# Patient Record
Sex: FEMALE | Race: BLACK OR AFRICAN AMERICAN | URBAN - METROPOLITAN AREA
[De-identification: names, ages, dates, MRNs, and addresses within clinical notes are randomized per-mention and may not be internally consistent; named-entity substitution may affect disease eponyms.]

---

## 2017-03-01 ENCOUNTER — OFFICE VISIT (OUTPATIENT)
Dept: INTERNAL MEDICINE CLINIC | Age: 42
End: 2017-03-01

## 2017-03-01 VITALS
RESPIRATION RATE: 16 BRPM | WEIGHT: 273 LBS | BODY MASS INDEX: 46.14 KG/M2 | HEART RATE: 115 BPM | DIASTOLIC BLOOD PRESSURE: 88 MMHG | OXYGEN SATURATION: 99 % | TEMPERATURE: 98.2 F | SYSTOLIC BLOOD PRESSURE: 154 MMHG

## 2017-03-01 DIAGNOSIS — N93.8 DUB (DYSFUNCTIONAL UTERINE BLEEDING): ICD-10-CM

## 2017-03-01 DIAGNOSIS — I89.0 LYMPHEDEMA OF BOTH LOWER EXTREMITIES: ICD-10-CM

## 2017-03-01 DIAGNOSIS — Z23 NEED FOR PROPHYLACTIC VACCINATION AGAINST STREPTOCOCCUS PNEUMONIAE (PNEUMOCOCCUS): ICD-10-CM

## 2017-03-01 DIAGNOSIS — E11.69 TYPE 2 DIABETES MELLITUS WITH OTHER SPECIFIED COMPLICATION (HCC): ICD-10-CM

## 2017-03-01 DIAGNOSIS — E11.43 GASTROPARESIS DIABETICORUM (HCC): ICD-10-CM

## 2017-03-01 DIAGNOSIS — E11.69 TYPE 2 DIABETES MELLITUS WITH OTHER SPECIFIED COMPLICATION (HCC): Primary | ICD-10-CM

## 2017-03-01 DIAGNOSIS — K31.84 GASTROPARESIS DIABETICORUM (HCC): ICD-10-CM

## 2017-03-01 LAB
A/G RATIO: 1.2 (ref 1.1–2.2)
ALBUMIN SERPL-MCNC: 4 G/DL (ref 3.4–5)
ALP BLD-CCNC: 109 U/L (ref 40–129)
ALT SERPL-CCNC: 21 U/L (ref 10–40)
ANION GAP SERPL CALCULATED.3IONS-SCNC: 20 MMOL/L (ref 3–16)
AST SERPL-CCNC: 21 U/L (ref 15–37)
BILIRUB SERPL-MCNC: <0.2 MG/DL (ref 0–1)
BUN BLDV-MCNC: 19 MG/DL (ref 7–20)
CALCIUM SERPL-MCNC: 9.1 MG/DL (ref 8.3–10.6)
CHLORIDE BLD-SCNC: 93 MMOL/L (ref 99–110)
CO2: 21 MMOL/L (ref 21–32)
CREAT SERPL-MCNC: 0.9 MG/DL (ref 0.6–1.1)
GFR AFRICAN AMERICAN: >60
GFR NON-AFRICAN AMERICAN: >60
GLOBULIN: 3.4 G/DL
GLUCOSE BLD-MCNC: 364 MG/DL (ref 70–99)
HCT VFR BLD CALC: 37.1 % (ref 36–48)
HEMOGLOBIN: 12.5 G/DL (ref 12–16)
MCH RBC QN AUTO: 28 PG (ref 26–34)
MCHC RBC AUTO-ENTMCNC: 33.7 G/DL (ref 31–36)
MCV RBC AUTO: 83.1 FL (ref 80–100)
PDW BLD-RTO: 13.2 % (ref 12.4–15.4)
PLATELET # BLD: 131 K/UL (ref 135–450)
PMV BLD AUTO: 12.4 FL (ref 5–10.5)
POTASSIUM SERPL-SCNC: 4.5 MMOL/L (ref 3.5–5.1)
RBC # BLD: 4.47 M/UL (ref 4–5.2)
SODIUM BLD-SCNC: 134 MMOL/L (ref 136–145)
TOTAL PROTEIN: 7.4 G/DL (ref 6.4–8.2)
WBC # BLD: 9.2 K/UL (ref 4–11)

## 2017-03-01 PROCEDURE — 90732 PPSV23 VACC 2 YRS+ SUBQ/IM: CPT | Performed by: INTERNAL MEDICINE

## 2017-03-01 PROCEDURE — 90471 IMMUNIZATION ADMIN: CPT | Performed by: INTERNAL MEDICINE

## 2017-03-01 PROCEDURE — 99214 OFFICE O/P EST MOD 30 MIN: CPT | Performed by: INTERNAL MEDICINE

## 2017-03-01 RX ORDER — METOCLOPRAMIDE 10 MG/1
10 TABLET ORAL 4 TIMES DAILY
Qty: 20 TABLET | Refills: 0 | Status: SHIPPED | OUTPATIENT
Start: 2017-03-01

## 2017-03-01 RX ORDER — PANTOPRAZOLE SODIUM 40 MG/1
40 TABLET, DELAYED RELEASE ORAL DAILY
Qty: 90 TABLET | Refills: 1 | Status: SHIPPED | OUTPATIENT
Start: 2017-03-01 | End: 2017-04-30 | Stop reason: ALTCHOICE

## 2017-03-01 RX ORDER — PROMETHAZINE HYDROCHLORIDE 25 MG/1
TABLET ORAL
Qty: 90 TABLET | Refills: 0 | Status: SHIPPED | OUTPATIENT
Start: 2017-03-01

## 2017-03-01 RX ORDER — COMPRESSION  PANTYHOSE, SMALL
2 EACH MISCELLANEOUS DAILY
Qty: 3 EACH | Refills: 0 | Status: SHIPPED | OUTPATIENT
Start: 2017-03-01

## 2017-03-01 RX ORDER — PROMETHAZINE HYDROCHLORIDE 25 MG/1
TABLET ORAL
Qty: 90 TABLET | Refills: 0 | Status: SHIPPED | OUTPATIENT
Start: 2017-03-01 | End: 2017-03-01 | Stop reason: SDUPTHER

## 2017-03-01 RX ORDER — LOSARTAN POTASSIUM 100 MG/1
100 TABLET ORAL DAILY
Qty: 90 TABLET | Refills: 3 | Status: SHIPPED | OUTPATIENT
Start: 2017-03-01

## 2017-03-01 RX ORDER — METFORMIN HYDROCHLORIDE 750 MG/1
750 TABLET, EXTENDED RELEASE ORAL 2 TIMES DAILY
Qty: 180 TABLET | Refills: 1 | Status: SHIPPED | OUTPATIENT
Start: 2017-03-01 | End: 2026-09-26

## 2017-03-01 ASSESSMENT — PATIENT HEALTH QUESTIONNAIRE - PHQ9
1. LITTLE INTEREST OR PLEASURE IN DOING THINGS: 0
2. FEELING DOWN, DEPRESSED OR HOPELESS: 0
SUM OF ALL RESPONSES TO PHQ9 QUESTIONS 1 & 2: 0
SUM OF ALL RESPONSES TO PHQ9 QUESTIONS 1 & 2: 0
SUM OF ALL RESPONSES TO PHQ QUESTIONS 1-9: 0
SUM OF ALL RESPONSES TO PHQ QUESTIONS 1-9: 0
2. FEELING DOWN, DEPRESSED OR HOPELESS: 0
1. LITTLE INTEREST OR PLEASURE IN DOING THINGS: 0

## 2017-03-01 ASSESSMENT — ENCOUNTER SYMPTOMS
GASTROINTESTINAL NEGATIVE: 1
RESPIRATORY NEGATIVE: 1

## 2017-03-02 LAB
CHOLESTEROL, TOTAL: 375 MG/DL (ref 0–199)
ESTIMATED AVERAGE GLUCOSE: 289.1 MG/DL
HBA1C MFR BLD: 11.7 %
HDLC SERPL-MCNC: 60 MG/DL (ref 40–60)
LDL CHOLESTEROL CALCULATED: ABNORMAL MG/DL
LDL CHOLESTEROL DIRECT: 273 MG/DL
TRIGL SERPL-MCNC: 418 MG/DL (ref 0–150)
VLDLC SERPL CALC-MCNC: ABNORMAL MG/DL

## 2017-03-14 RX ORDER — ATORVASTATIN CALCIUM 40 MG/1
40 TABLET, FILM COATED ORAL DAILY
Qty: 90 TABLET | Refills: 1 | Status: SHIPPED | OUTPATIENT
Start: 2017-03-14

## 2017-04-26 DIAGNOSIS — E11.69 TYPE 2 DIABETES MELLITUS WITH OTHER SPECIFIED COMPLICATION (HCC): ICD-10-CM

## 2017-09-11 ENCOUNTER — OFFICE VISIT (OUTPATIENT)
Dept: INTERNAL MEDICINE CLINIC | Age: 42
End: 2017-09-11

## 2017-09-11 ENCOUNTER — TELEPHONE (OUTPATIENT)
Dept: INTERNAL MEDICINE CLINIC | Age: 42
End: 2017-09-11

## 2017-09-11 VITALS
BODY MASS INDEX: 46.17 KG/M2 | SYSTOLIC BLOOD PRESSURE: 162 MMHG | HEART RATE: 109 BPM | OXYGEN SATURATION: 98 % | WEIGHT: 269 LBS | DIASTOLIC BLOOD PRESSURE: 86 MMHG

## 2017-09-11 DIAGNOSIS — G43.009 MIGRAINE WITHOUT AURA AND WITHOUT STATUS MIGRAINOSUS, NOT INTRACTABLE: ICD-10-CM

## 2017-09-11 DIAGNOSIS — R53.1 ACUTE RIGHT-SIDED WEAKNESS: ICD-10-CM

## 2017-09-11 DIAGNOSIS — R47.01 APHASIA: Primary | ICD-10-CM

## 2017-09-11 DIAGNOSIS — I10 ESSENTIAL HYPERTENSION: ICD-10-CM

## 2017-09-11 PROCEDURE — 99214 OFFICE O/P EST MOD 30 MIN: CPT | Performed by: NURSE PRACTITIONER

## 2017-09-11 RX ORDER — AMITRIPTYLINE HYDROCHLORIDE 10 MG/1
10 TABLET, FILM COATED ORAL NIGHTLY PRN
Qty: 30 TABLET | Refills: 3 | Status: SHIPPED | OUTPATIENT
Start: 2017-09-11

## 2017-09-12 ENCOUNTER — TELEPHONE (OUTPATIENT)
Dept: PSYCHIATRY | Age: 42
End: 2017-09-12

## 2017-09-12 ENCOUNTER — HOSPITAL ENCOUNTER (OUTPATIENT)
Dept: MRI IMAGING | Age: 42
Discharge: HOME | End: 2017-09-12
Attending: NURSE PRACTITIONER | Admitting: NURSE PRACTITIONER

## 2017-09-12 DIAGNOSIS — R47.01 APHASIA: ICD-10-CM

## 2017-09-12 DIAGNOSIS — R29.90 STROKE-LIKE SYMPTOM: Primary | ICD-10-CM

## 2017-09-13 DIAGNOSIS — R47.01 APHASIA: Primary | ICD-10-CM

## 2017-09-15 ENCOUNTER — TELEPHONE (OUTPATIENT)
Dept: INTERNAL MEDICINE CLINIC | Age: 42
End: 2017-09-15

## 2017-09-26 ENCOUNTER — INITIAL CONSULT (OUTPATIENT)
Dept: NEUROLOGY | Age: 42
End: 2017-09-26

## 2017-09-26 VITALS
HEIGHT: 64 IN | OXYGEN SATURATION: 99 % | DIASTOLIC BLOOD PRESSURE: 98 MMHG | WEIGHT: 266 LBS | BODY MASS INDEX: 45.41 KG/M2 | HEART RATE: 120 BPM | SYSTOLIC BLOOD PRESSURE: 149 MMHG

## 2017-09-26 DIAGNOSIS — E78.5 DYSLIPIDEMIA: ICD-10-CM

## 2017-09-26 DIAGNOSIS — I10 HTN (HYPERTENSION), BENIGN: ICD-10-CM

## 2017-09-26 DIAGNOSIS — I63.9 CEREBROVASCULAR ACCIDENT (CVA), UNSPECIFIED MECHANISM (HCC): Primary | ICD-10-CM

## 2017-09-26 DIAGNOSIS — E11.69 TYPE 2 DIABETES MELLITUS WITH OTHER SPECIFIED COMPLICATION (HCC): ICD-10-CM

## 2017-09-26 PROCEDURE — 99204 OFFICE O/P NEW MOD 45 MIN: CPT | Performed by: PSYCHIATRY & NEUROLOGY

## 2017-09-26 PROCEDURE — 36415 COLL VENOUS BLD VENIPUNCTURE: CPT | Performed by: PSYCHIATRY & NEUROLOGY

## 2017-09-26 ASSESSMENT — ENCOUNTER SYMPTOMS
VOMITING: 0
HEMOPTYSIS: 0
PHOTOPHOBIA: 0
COUGH: 0
NAUSEA: 0
DOUBLE VISION: 0

## 2019-05-31 ENCOUNTER — TELEPHONE (OUTPATIENT)
Dept: INTERNAL MEDICINE CLINIC | Age: 44
End: 2019-05-31

## 2019-09-19 ENCOUNTER — TELEPHONE (OUTPATIENT)
Dept: INTERNAL MEDICINE CLINIC | Age: 44
End: 2019-09-19

## 2021-01-31 ENCOUNTER — HOSPITAL ENCOUNTER (EMERGENCY)
Age: 46
Discharge: HOME | End: 2021-01-31
Payer: COMMERCIAL

## 2021-01-31 ENCOUNTER — APPOINTMENT (OUTPATIENT)
Dept: GENERAL RADIOLOGY | Age: 46
End: 2021-01-31
Payer: COMMERCIAL

## 2021-01-31 VITALS
DIASTOLIC BLOOD PRESSURE: 81 MMHG | HEART RATE: 97 BPM | WEIGHT: 167 LBS | HEIGHT: 64 IN | RESPIRATION RATE: 16 BRPM | SYSTOLIC BLOOD PRESSURE: 124 MMHG | BODY MASS INDEX: 28.51 KG/M2 | TEMPERATURE: 98.2 F | OXYGEN SATURATION: 99 %
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
## 2021-01-31 DIAGNOSIS — M25.552 LEFT HIP PAIN: Primary | ICD-10-CM

## 2021-01-31 PROCEDURE — 99283 EMERGENCY DEPT VISIT LOW MDM: CPT

## 2021-01-31 PROCEDURE — 73502 X-RAY EXAM HIP UNI 2-3 VIEWS: CPT

## 2021-01-31 PROCEDURE — 6370000000 HC RX 637 (ALT 250 FOR IP): Performed by: PHYSICIAN ASSISTANT

## 2021-01-31 RX ORDER — AMLODIPINE BESYLATE 5 MG/1
5 TABLET ORAL DAILY
COMMUNITY

## 2021-01-31 RX ORDER — HYDROCODONE BITARTRATE AND ACETAMINOPHEN 5; 325 MG/1; MG/1
1 TABLET ORAL ONCE
Status: COMPLETED | OUTPATIENT
Start: 2021-01-31 | End: 2021-01-31

## 2021-01-31 RX ORDER — LIDOCAINE 50 MG/G
1 PATCH TOPICAL DAILY
Qty: 30 PATCH | Refills: 0 | Status: SHIPPED | OUTPATIENT
Start: 2021-01-31

## 2021-01-31 RX ORDER — LIDOCAINE 4 G/G
1 PATCH TOPICAL ONCE
Status: DISCONTINUED | OUTPATIENT
Start: 2021-01-31 | End: 2021-01-31 | Stop reason: HOSPADM

## 2021-01-31 RX ADMIN — HYDROCODONE BITARTRATE AND ACETAMINOPHEN 1 TABLET: 5; 325 TABLET ORAL at 12:23

## 2021-01-31 ASSESSMENT — PAIN SCALES - GENERAL
PAINLEVEL_OUTOF10: 4
PAINLEVEL_OUTOF10: 4

## 2021-01-31 NOTE — ED PROVIDER NOTES
[address] [facility]        Pt Name: Dawson Bronson  MRN: [id number]  [place] 1975  Date of evaluation: 1/31/2021  Provider: Anh Landa PA-C  PCP: Lisbeth Tristan MD    SERGE. I have evaluated this patient. My supervising physician was available for consultation. CHIEF COMPLAINT       Chief Complaint   Patient presents with    Fall     Pt arrived via FF squad from [facility] after mechanical fall. States she tripped over her cell phone , pain in left hip. Hx stroke, residual right sided weakness. HISTORY OF PRESENT ILLNESS   (Location, Timing/Onset, Context/Setting, Quality, Duration, Modifying Factors, Severity, Associated Signs and Symptoms)  Note limiting factors. Dawson Bronson is a 39 y.o. female the presents the emergency department with a chief complaint of some left hip pain. Patient recently moved into [place] assisted living and has history of chronic right-sided weakness from previous stroke. She is unable to ambulate states she was transferring herself from her bed to her wheelchair when she fell onto her left hip. She states she only has some pain when she moves this. This just happened for presenting to the emergency department. She denies hitting her head, loss of consciousness, neck pain, back pain or any other injury from this fall. She denies wounds or use of anticoagulants. Rates the pain a 4 out of 10 when she moves it. Nursing Notes were all reviewed and agreed with or any disagreements were addressed in the HPI. REVIEW OF SYSTEMS    (2-9 systems for level 4, 10 or more for level 5)     Review of Systems    Positives and Pertinent negatives as per HPI. Except as noted above in the ROS, all other systems were reviewed and negative.        PAST MEDICAL HISTORY     Past Medical History:   Diagnosis Date    Anemia     Diabetes mellitus ([facility] [address]     Essential hypertension 10/14/2015    Gastroparesis     GERD (gastroesophageal reflux disease)     Hyperlipemia          SURGICAL HISTORY     Past Surgical History:   Procedure Laterality Date    DILATION AND CURETTAGE OF UTERUS      for bleeding    ENDOMETRIAL ABLATION      TONSILLECTOMY           CURRENTMEDICATIONS       Previous Medications    AMITRIPTYLINE (ELAVIL) 10 MG TABLET    Take 1 tablet by mouth nightly as needed for Sleep    AMLODIPINE (NORVASC) 5 MG TABLET    Take 5 mg by mouth daily    ATORVASTATIN (LIPITOR) 40 MG TABLET    Take 1 tablet by mouth daily    CLOBETASOL (TEMOVATE) 0.05 % OINTMENT    APPLY TO THE AFFECTED AREA TWICE DAILY    ELASTIC BANDAGES & SUPPORTS (FUTURO FIRM COMPRESSION HOSE) MISC    2 Units by Does not apply route daily    GLUCOSE MONITORING KIT (FREESTYLE) MONITORING KIT    1 kit by Does not apply route daily as needed    IBUPROFEN (ADVIL;MOTRIN) 800 MG TABLET    TAKE 1 TABLET BY MOUTH EVERY 8 HOURS FOR DISCOMFORT    INSULIN GLARGINE (TOUJEO SOLOSTAR) 300 UNIT/ML INJECTION PEN    INJECT 15 UNITS EVERY NIGHT AT BEDTIME    INSULIN PEN NEEDLE (MEIJER PEN NEEDLES) 31G X 8 MM MISC    1 each by Does not apply route daily    LANCET DEVICE MISC    Use as directed. Ok to dispense patient preferred or insurance covered brand    LOSARTAN (COZAAR) 100 MG TABLET    Take 1 tablet by mouth daily Indications: High Blood Pressure    METFORMIN (GLUCOPHAGE-XR) 750 MG EXTENDED RELEASE TABLET    Take 1 tablet by mouth 2 times daily Indications: Diabetes    METOCLOPRAMIDE (REGLAN) 10 MG TABLET    Take 1 tablet by mouth 4 times daily WARNING:  May cause drowsiness. May impair ability to operate vehicles or machinery. Do not use in combination with alcohol.     PROMETHAZINE (PHENERGAN) 25 MG TABLET    TAKE 1 TABLET BY MOUTH EVERY 8 HOURS AS NEEDED FOR NAUSEA(NIGHTLY AS NEEDED)         ALLERGIES     Aspirin, Lisinopril, Orange concentrate [flavoring agent], and Adhesive tape    FAMILYHISTORY       Family History Adopted: Yes          SOCIAL HISTORY       Social History     Tobacco Use    Smoking status: Former Smoker    Smokeless tobacco: Never Used   Substance Use Topics    Alcohol use: Yes     Alcohol/week: 0.0 - 1.0 standard drinks    Drug use: No       SCREENINGS             PHYSICAL EXAM    (up to 7 for level 4, 8 or more for level 5)     ED Triage Vitals [01/31/21 1206]   BP Temp Temp Source Pulse Resp SpO2 Height Weight   114/71 98.2 °F (36.8 °C) Oral 102 16 99 % 5' 4\" (1.626 m) 167 lb (75.8 kg)       Physical Exam  Vitals signs and nursing note reviewed. Constitutional:       Appearance: She is well-developed. She is not diaphoretic. HENT:      Head: Atraumatic. Nose: Nose normal.   Eyes:      General:         Right eye: No discharge. Left eye: No discharge. Neck:      Musculoskeletal: Normal range of motion. Cardiovascular:      Pulses: Normal pulses. Comments: 2+ dorsal pedal pulse in left foot  Chest:      Chest wall: No tenderness. Abdominal:      Tenderness: There is no abdominal tenderness. Musculoskeletal: Normal range of motion. General: Tenderness present. No swelling or deformity. Comments: Some subjective tenderness over the lateral left hip without any obvious deformity. Patient is able to fully flex her left hip and left knee but just has some discomfort with this. Full range of motion of left ankle. No midline tenderness or step-off in the neck or back. No joint warmth, erythema or rash. Skin:     General: Skin is warm and dry. Findings: No erythema or rash. Neurological:      Mental Status: She is alert and oriented to person, place, and time. Cranial Nerves: No cranial nerve deficit. Psychiatric:         Behavior: Behavior normal.         DIAGNOSTIC RESULTS   LABS:    Labs Reviewed - No data to display    All other labs were within normal range or not returned as of this dictation. EKG:  All EKG's are interpreted by the Emergency Department Physician in the absence of a cardiologist.  Please see their note for interpretation of EKG. RADIOLOGY:   Non-plain film images such as CT, Ultrasound and MRI are read by the radiologist. Plain radiographic images are visualized and preliminarily interpreted by the ED Provider with the below findings:        Interpretation per the Radiologist below, if available at the time of this note:    XR HIP LEFT (2-3 VIEWS)   Final Result   No acute osseous injury of the pelvis or left hip. No results found. PROCEDURES   Unless otherwise noted below, none     Procedures    CRITICAL CARE TIME   N/A    CONSULTS:  None      EMERGENCY DEPARTMENT COURSE and DIFFERENTIAL DIAGNOSIS/MDM:   Vitals:    Vitals:    01/31/21 1206 01/31/21 1230 01/31/21 1300   BP: 114/71 124/76    Pulse: 102  97   Resp: 16     Temp: 98.2 °F (36.8 °C)     TempSrc: Oral     SpO2: 99% 98%    Weight: 167 lb (75.8 kg)     Height: 5' 4\" (1.626 m)         Patient was given the following medications:  Medications   lidocaine 4 % external patch 1 patch (1 patch Transdermal Patch Applied 1/31/21 1223)   HYDROcodone-acetaminophen (NORCO) 5-325 MG per tablet 1 tablet (1 tablet Oral Given 1/31/21 1223)           Patient presented with some left hip pain after falling while trying to transfer herself from her bed to her wheelchair. She is able to move her hip. There is no deformity. X-ray imaging is unremarkable. No sign of trauma. She is distally neurovascular intact. No other bony tenderness or pain. Low suspicion for pelvic or hip fracture, septic arthritis, cellulitis or other emergent etiology. She was educated on symptomatic treatment. Will follow-up as an outpatient return here for any worsening of symptoms or problems at home. FINAL IMPRESSION      1. Left hip pain    2.  Fall, initial encounter          DISPOSITION/PLAN   DISPOSITION Decision To Discharge 01/31/2021 12:58:19 PM      PATIENT REFERREDTO:  Shannon Simons Joann Fuchs [address]  [address]  [phone]    Schedule an appointment as soon as possible for a visit in 3 days  For re-check    [facility] Emergency Department  [address] [place] [address]  [phone]    As needed      DISCHARGE MEDICATIONS:  New Prescriptions    LIDOCAINE (LIDODERM) 5 %    Place 1 patch onto the skin daily 12 hours on, 12 hours off.        DISCONTINUED MEDICATIONS:  Discontinued Medications    No medications on file              (Please note that portions of this note were completed with a voice recognition program.  Efforts were made to edit the dictations but occasionally words are mis-transcribed.)    Trevin Prado PA-C (electronically signed)            Trevin Prado PA-C  01/31/21 1939

## 2021-01-31 NOTE — ED NOTES
Attempted to call report on patient to [facility] facility without success. No answer on facility phone after holding for 10 minutes.       Trung Jennings RN  01/31/21 7826

## 2021-01-31 NOTE — ED NOTES
Pt discharged in stable condition, VSS, no signs of distress. Discharge instructions and meds reviewed. Pt verbalizes understanding and states no further questions or concerns unaddressed.        Perez Brooks RN  01/31/21 5684

## 2021-01-31 NOTE — ED NOTES
Bedside handoff given to [facility] transport team. Denies questions at this time. Patient discharged in stable condition.       Alexandria Kuo RN  01/31/21 4200

## 2021-02-13 ENCOUNTER — HOSPITAL ENCOUNTER (EMERGENCY)
Age: 46
Discharge: HOME | End: 2021-02-14
Attending: EMERGENCY MEDICINE
Payer: COMMERCIAL

## 2021-02-13 DIAGNOSIS — T78.40XA ALLERGIC REACTION, INITIAL ENCOUNTER: Primary | ICD-10-CM

## 2021-02-13 LAB — S PYO AG THROAT QL: NEGATIVE

## 2021-02-13 PROCEDURE — 6360000002 HC RX W HCPCS: Performed by: PHYSICIAN ASSISTANT

## 2021-02-13 PROCEDURE — 96374 THER/PROPH/DIAG INJ IV PUSH: CPT

## 2021-02-13 PROCEDURE — 96375 TX/PRO/DX INJ NEW DRUG ADDON: CPT

## 2021-02-13 PROCEDURE — 87081 CULTURE SCREEN ONLY: CPT

## 2021-02-13 PROCEDURE — 99283 EMERGENCY DEPT VISIT LOW MDM: CPT

## 2021-02-13 PROCEDURE — 87880 STREP A ASSAY W/OPTIC: CPT

## 2021-02-13 PROCEDURE — 2500000003 HC RX 250 WO HCPCS: Performed by: PHYSICIAN ASSISTANT

## 2021-02-13 PROCEDURE — 2580000003 HC RX 258: Performed by: PHYSICIAN ASSISTANT

## 2021-02-13 RX ORDER — GLIPIZIDE 10 MG/1
10 TABLET ORAL
COMMUNITY

## 2021-02-13 RX ORDER — INSULIN GLARGINE 100 [IU]/ML
10 INJECTION, SOLUTION SUBCUTANEOUS NIGHTLY
COMMUNITY

## 2021-02-13 RX ORDER — PANTOPRAZOLE SODIUM 40 MG/1
40 TABLET, DELAYED RELEASE ORAL DAILY
COMMUNITY

## 2021-02-13 RX ORDER — 0.9 % SODIUM CHLORIDE 0.9 %
1000 INTRAVENOUS SOLUTION INTRAVENOUS ONCE
Status: COMPLETED | OUTPATIENT
Start: 2021-02-13 | End: 2021-02-13

## 2021-02-13 RX ORDER — METHYLPREDNISOLONE SODIUM SUCCINATE 125 MG/2ML
125 INJECTION, POWDER, LYOPHILIZED, FOR SOLUTION INTRAMUSCULAR; INTRAVENOUS ONCE
Status: COMPLETED | OUTPATIENT
Start: 2021-02-13 | End: 2021-02-13

## 2021-02-13 RX ORDER — DIPHENHYDRAMINE HYDROCHLORIDE 50 MG/ML
25 INJECTION INTRAMUSCULAR; INTRAVENOUS ONCE
Status: COMPLETED | OUTPATIENT
Start: 2021-02-13 | End: 2021-02-13

## 2021-02-13 RX ORDER — SENNA PLUS 8.6 MG/1
1 TABLET ORAL PRN
COMMUNITY

## 2021-02-13 RX ORDER — PREDNISONE 10 MG/1
40 TABLET ORAL DAILY
Qty: 12 TABLET | Refills: 0 | Status: SHIPPED | OUTPATIENT
Start: 2021-02-13 | End: 2021-02-16

## 2021-02-13 RX ORDER — HYDRALAZINE HYDROCHLORIDE 25 MG/1
25 TABLET, FILM COATED ORAL 2 TIMES DAILY
COMMUNITY

## 2021-02-13 RX ORDER — ACETAMINOPHEN 325 MG/1
650 TABLET ORAL EVERY 4 HOURS PRN
COMMUNITY

## 2021-02-13 RX ADMIN — SODIUM CHLORIDE 1000 ML: 9 INJECTION, SOLUTION INTRAVENOUS at 20:27

## 2021-02-13 RX ADMIN — METHYLPREDNISOLONE SODIUM SUCCINATE 125 MG: 125 INJECTION, POWDER, FOR SOLUTION INTRAMUSCULAR; INTRAVENOUS at 20:28

## 2021-02-13 RX ADMIN — DIPHENHYDRAMINE HYDROCHLORIDE 25 MG: 50 INJECTION, SOLUTION INTRAMUSCULAR; INTRAVENOUS at 20:28

## 2021-02-13 RX ADMIN — FAMOTIDINE 20 MG: 10 INJECTION, SOLUTION INTRAVENOUS at 20:28

## 2021-02-13 ASSESSMENT — ENCOUNTER SYMPTOMS
ABDOMINAL PAIN: 0
NAUSEA: 0
SHORTNESS OF BREATH: 0
VOMITING: 0
TROUBLE SWALLOWING: 1

## 2021-02-14 VITALS
DIASTOLIC BLOOD PRESSURE: 83 MMHG | OXYGEN SATURATION: 97 % | HEIGHT: 64 IN | WEIGHT: 168 LBS | HEART RATE: 75 BPM | RESPIRATION RATE: 15 BRPM | TEMPERATURE: 100.3 F | BODY MASS INDEX: 28.68 KG/M2 | SYSTOLIC BLOOD PRESSURE: 138 MMHG

## 2021-02-14 NOTE — ED PROVIDER NOTES
[address] [facility] [address]  [phone]    If symptoms worsen    Josi BradleyvingJoann [address]  [address] [facility] [place] [address]  [phone]    Schedule an appointment as soon as possible for a visit in 2 days  for re-evaluation      Discharge Medications:  New Prescriptions    PREDNISONE (DELTASONE) 10 MG TABLET    Take 4 tablets by mouth daily for 3 doses       FINAL IMPRESSION  1. Allergic reaction, initial encounter        Blood pressure 123/76, pulse 71, temperature 100.3 °F (37.9 °C), temperature source Oral, resp. rate 15, height 5' 4\" (1.626 m), weight 168 lb (76.2 kg), SpO2 97 %, not currently breastfeeding. For further details of [address] emergency department encounter, please see documentation by advanced practice provider, ROSELIA Lynch.     Zeinab Stafford DO (electronically signed)  Attending Emergency Physician       Zeinab Stafford DO  02/14/21 0005

## 2021-02-14 NOTE — ED NOTES
Pt feeling better at this time. States her throat is feeling less tight and improving. [facility] [place] notified.       James Brown RN  02/13/21 2025

## 2021-02-14 NOTE — ED NOTES
Bed: 20  Expected date:   Expected time:   Means of arrival:   Comments:  EVS called     Saul Mcgill RN  02/13/21 4447

## 2021-02-14 NOTE — ED PROVIDER NOTES
[address] [facility]        Pt Name: Jacquie Lanes  MRN: [id number]  [place] 1975  Date of evaluation: 2/13/2021  Provider: Dean Ennis PA-C  PCP: Raul Heller MD     I have seen and evaluated this patient with my supervising physician Tarik Xie DO.    CHIEF COMPLAINT       Chief Complaint   Patient presents with    Allergic Reaction     in via squad, from [place] [address]. hx stroke with R sided deficit. allergic to tomato and had pasta tonight with spaghetti sauce for dinner. feels like her throat is tight. no meds given by squad or nursing home. HISTORY OF PRESENT ILLNESS   (Location, Timing/Onset, Context/Setting, Quality, Duration, Modifying Factors, Severity, Associated Signs and Symptoms)  Note limiting factors. Jacquie Lanes is a 39 y.o. female patient presents emergency department for evaluation of allergic reaction. Patient was eating lasagna this evening for dinner but she is allergic to tomatoes. Patient states this happened about 1 hour prior to arrival.  She feels like her throat is closing. She states it feels very tight when she is breathing or swallowing. She denies any itching or swelling of her lips. Patient states she has been prescribed an EpiPen but has never had to use it. She denies any other issues at this time. Denies any nausea, vomiting, chest pain or shortness of breath. Nursing Notes were all reviewed and agreed with or any disagreements were addressed in the HPI. REVIEW OF SYSTEMS    (2-9 systems for level 4, 10 or more for level 5)     Review of Systems   Constitutional: Negative for fatigue and fever. HENT: Positive for trouble swallowing. Eyes: Negative for visual disturbance. Respiratory: Negative for shortness of breath. Cardiovascular: Negative for chest pain. Gastrointestinal: Negative for abdominal pain, nausea and vomiting. note:    No orders to display     No results found. PROCEDURES   Unless otherwise noted below, none     Procedures    CRITICAL CARE TIME   N/A    CONSULTS:  None      EMERGENCY DEPARTMENT COURSE and DIFFERENTIAL DIAGNOSIS/MDM:   Vitals:    Vitals:    02/13/21 2100 02/13/21 2130 02/13/21 2200 02/13/21 2230   BP: 117/77 125/76 132/81 123/76   Pulse: 79 77  71   Resp: 16 15  15   Temp:       TempSrc:       SpO2: 98% 98% 97% 97%   Weight:       Height:           Patient was given the following medications:  Medications   EPINEPHrine 1 MG/ML injection 0.3 mg (has no administration in time range)   0.9 % sodium chloride bolus (0 mLs Intravenous Stopped 2/13/21 2142)   diphenhydrAMINE (BENADRYL) injection 25 mg (25 mg Intravenous Given 2/13/21 2028)   famotidine (PEPCID) injection 20 mg (20 mg Intravenous Given 2/13/21 2028)   methylPREDNISolone sodium (SOLU-MEDROL) injection 125 mg (125 mg Intravenous Given 2/13/21 2028)         Patient presents emergency department for evaluation of ingestion of tomatoes which is a known allergen. Patient was eating lasagna at [address] for dinner. Patient states that shortly after eating this she developed some throat swelling. She denies any difficulty breathing or swallowing but that it just feels a little swollen. Denies any shortness of breath, chest pain, abdominal pain, nausea vomiting diarrhea. Patient has a history of stroke with right-sided deficit. She has no new deficits on examination. She has some mild dysarthria which is chronic in nature. She is in no respiratory distress. She is not stridorous or posturing. No swelling noted to posterior oropharynx with the floor of the mouth. No lip swelling noted. No hot potato voice. No trismus. Lungs clear to auscultation bilaterally. Heart rate is regular without murmurs rubs or gallops.   Patient states she has never required epinephrine for her allergies in the past however she does have a

## 2021-02-14 NOTE — ED NOTES
Pt resting in bed, denies needs at this time. Throat is feeling better. Call light within reach.       Tiffanie Hawkins RN  02/13/21 1197

## 2021-02-16 LAB — S PYO THROAT QL CULT: NORMAL

## 2021-02-19 ENCOUNTER — APPOINTMENT (OUTPATIENT)
Dept: GENERAL RADIOLOGY | Age: 46
End: 2021-02-19
Payer: COMMERCIAL

## 2021-02-19 ENCOUNTER — APPOINTMENT (OUTPATIENT)
Dept: CT IMAGING | Age: 46
End: 2021-02-19
Payer: COMMERCIAL

## 2021-02-19 ENCOUNTER — HOSPITAL ENCOUNTER (EMERGENCY)
Age: 46
Discharge: HOME | End: 2021-02-19
Payer: COMMERCIAL

## 2021-02-19 VITALS
TEMPERATURE: 97.5 F | OXYGEN SATURATION: 100 % | HEIGHT: 64 IN | BODY MASS INDEX: 28.68 KG/M2 | RESPIRATION RATE: 18 BRPM | HEART RATE: 78 BPM | WEIGHT: 168 LBS | SYSTOLIC BLOOD PRESSURE: 147 MMHG | DIASTOLIC BLOOD PRESSURE: 99 MMHG

## 2021-02-19 DIAGNOSIS — S93.401A SPRAIN OF RIGHT ANKLE, UNSPECIFIED LIGAMENT, INITIAL ENCOUNTER: ICD-10-CM
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
## 2021-02-19 LAB
A/G RATIO: 1.1 (ref 1.1–2.2)
ALBUMIN SERPL-MCNC: 4 G/DL (ref 3.4–5)
ALP BLD-CCNC: 110 U/L (ref 40–129)
ALT SERPL-CCNC: 23 U/L (ref 10–40)
ANION GAP SERPL CALCULATED.3IONS-SCNC: 10 MMOL/L (ref 3–16)
AST SERPL-CCNC: 16 U/L (ref 15–37)
BASOPHILS ABSOLUTE: 0 K/UL (ref 0–0.2)
BASOPHILS RELATIVE PERCENT: 0.5 %
BILIRUB SERPL-MCNC: <0.2 MG/DL (ref 0–1)
BUN BLDV-MCNC: 26 MG/DL (ref 7–20)
CALCIUM SERPL-MCNC: 9.1 MG/DL (ref 8.3–10.6)
CHLORIDE BLD-SCNC: 109 MMOL/L (ref 99–110)
CO2: 23 MMOL/L (ref 21–32)
CREAT SERPL-MCNC: 0.9 MG/DL (ref 0.6–1.1)
EOSINOPHILS ABSOLUTE: 0.1 K/UL (ref 0–0.6)
EOSINOPHILS RELATIVE PERCENT: 1.8 %
GFR AFRICAN AMERICAN: >60
GFR NON-AFRICAN AMERICAN: >60
GLOBULIN: 3.8 G/DL
GLUCOSE BLD-MCNC: 84 MG/DL (ref 70–99)
HCT VFR BLD CALC: 32.7 % (ref 36–48)
HEMOGLOBIN: 10.5 G/DL (ref 12–16)
LYMPHOCYTES ABSOLUTE: 3 K/UL (ref 1–5.1)
LYMPHOCYTES RELATIVE PERCENT: 37.1 %
MCH RBC QN AUTO: 27 PG (ref 26–34)
MCHC RBC AUTO-ENTMCNC: 32.1 G/DL (ref 31–36)
MCV RBC AUTO: 84 FL (ref 80–100)
MONOCYTES ABSOLUTE: 0.6 K/UL (ref 0–1.3)
MONOCYTES RELATIVE PERCENT: 6.8 %
NEUTROPHILS ABSOLUTE: 4.3 K/UL (ref 1.7–7.7)
NEUTROPHILS RELATIVE PERCENT: 53.8 %
PDW BLD-RTO: 14.2 % (ref 12.4–15.4)
PLATELET # BLD: 165 K/UL (ref 135–450)
PMV BLD AUTO: 11.3 FL (ref 5–10.5)
POTASSIUM SERPL-SCNC: 4.2 MMOL/L (ref 3.5–5.1)
RBC # BLD: 3.89 M/UL (ref 4–5.2)
SODIUM BLD-SCNC: 142 MMOL/L (ref 136–145)
TOTAL CK: 77 U/L (ref 26–192)
TOTAL PROTEIN: 7.8 G/DL (ref 6.4–8.2)
WBC # BLD: 8 K/UL (ref 4–11)

## 2021-02-19 PROCEDURE — 82550 ASSAY OF CK (CPK): CPT

## 2021-02-19 PROCEDURE — 73630 X-RAY EXAM OF FOOT: CPT

## 2021-02-19 PROCEDURE — 80053 COMPREHEN METABOLIC PANEL: CPT

## 2021-02-19 PROCEDURE — 85025 COMPLETE CBC W/AUTO DIFF WBC: CPT

## 2021-02-19 PROCEDURE — 36415 COLL VENOUS BLD VENIPUNCTURE: CPT

## 2021-02-19 PROCEDURE — 71045 X-RAY EXAM CHEST 1 VIEW: CPT

## 2021-02-19 PROCEDURE — 99284 EMERGENCY DEPT VISIT MOD MDM: CPT

## 2021-02-19 PROCEDURE — 73610 X-RAY EXAM OF ANKLE: CPT

## 2021-02-19 PROCEDURE — 70450 CT HEAD/BRAIN W/O DYE: CPT

## 2021-02-19 PROCEDURE — 72125 CT NECK SPINE W/O DYE: CPT

## 2021-02-19 ASSESSMENT — ENCOUNTER SYMPTOMS
VOMITING: 0
SHORTNESS OF BREATH: 0
NAUSEA: 0

## 2021-02-19 NOTE — ED PROVIDER NOTES
[address] [facility]        Pt Name: Jesus Aguilar  MRN: [id number]  [place] 1975  Date of evaluation: 2/19/2021  Provider: Xiomara Haywood PA-C  PCP: Claude Spindle, MD    SERGE. I have evaluated this patient. My supervising physician was available for consultation. CHIEF COMPLAINT       Chief Complaint   Patient presents with    Fall     Arrived by [facility] rt fall sustained last night at 0100. Pt was found on the floor by staff at [address] [place] [address] at 0930. Denies hitting her head; reporting pain to right ankle. Hx of stroke with right sided deficits       HISTORY OF PRESENT ILLNESS   (Location, Timing/Onset, Context/Setting, Quality, Duration, Modifying Factors, Severity, Associated Signs and Symptoms)  Note limiting factors. Jesus Aguilar is a 39 y.o. female presents to the emergency department today for evaluation for a fall which occurred sometime last night. The patient has a history of hyperlipidemia, hypertension. History of hemiparesis on the right. The patient is from [address] [place] [address]. Apparently the patient fell sometime last night. When asked why the patient fell, she states that she has difficulty walking due to her baseline right-sided weakness and she states that this caused her fall. She states that she was lying on the ground until this morning until they came to help her get up. The patient arrives to the ED she is complaining of pain and some swelling to her right foot and right ankle, she states that this is the side that she is weak on, this is a side she is unable to ambulate with. The patient denies feeling dizzy, lightheaded, having chest pain or shortness of breath before her fall, her fall was mechanical in nature. She did hit her head, no loss of consciousness. No vomiting. She has no headaches. No numbness, tingling or weakness other than her baseline. No other complaints    Nursing Notes were all reviewed and agreed with or any disagreements were addressed in the HPI. REVIEW OF SYSTEMS    (2-9 systems for level 4, 10 or more for level 5)     Review of Systems   Constitutional: Negative for activity change, appetite change and fever. Respiratory: Negative for shortness of breath. Cardiovascular: Negative for chest pain. Gastrointestinal: Negative for nausea and vomiting. Musculoskeletal: Positive for arthralgias. Skin: Negative for wound. Neurological: Negative for weakness and numbness. Positives and Pertinent negatives as per HPI. Except as noted above in the ROS, all other systems were reviewed and negative.        PAST MEDICAL HISTORY     Past Medical History:   Diagnosis Date    Anemia     Diabetes mellitus ([address]     Essential hypertension 10/14/2015    Gastroparesis     GERD (gastroesophageal reflux disease)     Hyperlipemia          SURGICAL HISTORY     Past Surgical History:   Procedure Laterality Date    DILATION AND CURETTAGE OF UTERUS      for bleeding    ENDOMETRIAL ABLATION      TONSILLECTOMY           CURRENTMEDICATIONS       Previous Medications    ACETAMINOPHEN (TYLENOL) 325 MG TABLET    Take 650 mg by mouth every 4 hours as needed for Pain    AMITRIPTYLINE (ELAVIL) 10 MG TABLET    Take 1 tablet by mouth nightly as needed for Sleep    AMLODIPINE (NORVASC) 5 MG TABLET    Take 5 mg by mouth daily    ATORVASTATIN (LIPITOR) 40 MG TABLET    Take 1 tablet by mouth daily    BISACODYL (DULCOLAX) 5 MG EC TABLET    Take 5 mg by mouth daily as needed for Constipation    CLOBETASOL (TEMOVATE) 0.05 % OINTMENT    APPLY TO THE AFFECTED AREA TWICE DAILY    ELASTIC BANDAGES & SUPPORTS (FUTURO FIRM COMPRESSION HOSE) MISC    2 Units by Does not apply route daily    GLIPIZIDE (GLUCOTROL) 10 MG TABLET    Take 10 mg by mouth 2 times daily (before meals)    GLUCOSE MONITORING KIT (FREESTYLE) MONITORING KIT    1 kit by Does not apply route daily as needed    HYDRALAZINE (APRESOLINE) 25 MG TABLET    Take 25 mg by mouth 2 times daily    IBUPROFEN (ADVIL;MOTRIN) 800 MG TABLET    TAKE 1 TABLET BY MOUTH EVERY 8 HOURS FOR DISCOMFORT    INSULIN GLARGINE (LANTUS) 100 UNIT/ML INJECTION VIAL    Inject 10 Units into the skin nightly    INSULIN GLARGINE (TOUJEO SOLOSTAR) 300 UNIT/ML INJECTION PEN    INJECT 15 UNITS EVERY NIGHT AT BEDTIME    INSULIN PEN NEEDLE (MEIJER PEN NEEDLES) 31G X 8 MM MISC    1 each by Does not apply route daily    LANCET DEVICE MISC    Use as directed. Ok to dispense patient preferred or insurance covered brand    LIDOCAINE (LIDODERM) 5 %    Place 1 patch onto the skin daily 12 hours on, 12 hours off. LOSARTAN (COZAAR) 100 MG TABLET    Take 1 tablet by mouth daily Indications: High Blood Pressure    MAGNESIUM HYDROXIDE (MILK OF MAGNESIA) 400 MG/5ML SUSPENSION    Take 30 mLs by mouth daily as needed for Constipation    METFORMIN (GLUCOPHAGE-XR) 750 MG EXTENDED RELEASE TABLET    Take 1 tablet by mouth 2 times daily Indications: Diabetes    METOCLOPRAMIDE (REGLAN) 10 MG TABLET    Take 1 tablet by mouth 4 times daily WARNING:  May cause drowsiness. May impair ability to operate vehicles or machinery. Do not use in combination with alcohol.     METOPROLOL TARTRATE (LOPRESSOR) 25 MG TABLET    Take 25 mg by mouth 2 times daily    PANTOPRAZOLE (PROTONIX) 40 MG TABLET    Take 40 mg by mouth daily    PROMETHAZINE (PHENERGAN) 25 MG TABLET    TAKE 1 TABLET BY MOUTH EVERY 8 HOURS AS NEEDED FOR NAUSEA(NIGHTLY AS NEEDED)    SENNA (SENOKOT) 8.6 MG TABLET    Take 1 tablet by mouth as needed         ALLERGIES     Fish allergy, Tomato, Aspirin, Lisinopril, Orange concentrate [flavoring agent], Orange fruit [citrus], and Adhesive tape    FAMILYHISTORY       Family History   Adopted: Yes          SOCIAL HISTORY       Social History     Tobacco Use    Smoking status: Former Smoker    Smokeless tobacco: Never Used   Substance Use Topics    Alcohol use: Yes     Alcohol/week: 0.0 - 1.0 standard drinks    Drug use: No       SCREENINGS             PHYSICAL EXAM    (up to 7 for level 4, 8 or more for level 5)     ED Triage Vitals   BP Temp Temp Source Pulse Resp SpO2 Height Weight   02/19/21 1152 02/19/21 1156 02/19/21 1156 02/19/21 1200 02/19/21 1201 02/19/21 1152 02/19/21 1200 02/19/21 1200   (!) 110/57 97.5 °F (36.4 °C) Oral 78 18 100 % 5' 4\" (1.626 m) 168 lb (76.2 kg)       Physical Exam  Vitals signs and nursing note reviewed. Constitutional:       Appearance: She is well-developed. She is not diaphoretic. HENT:      Head: Normocephalic and atraumatic. Right Ear: External ear normal.      Left Ear: External ear normal.      Nose: Nose normal.   Eyes:      General:         Right eye: No discharge. Left eye: No discharge. Neck:      Musculoskeletal: Normal range of motion and neck supple. Trachea: No tracheal deviation. Cardiovascular:      Rate and Rhythm: Normal rate and regular rhythm. Heart sounds: No murmur. Pulmonary:      Effort: Pulmonary effort is normal. No respiratory distress. Breath sounds: Normal breath sounds. No wheezing or rales. Musculoskeletal: Normal range of motion. Comments: There is diffuse tenderness, and mild edema noted of the dorsum of the right foot and the right ankle. Achilles is intact. She is full range of motion passively. Dorsalis pedis and posterior tibialis pulse are 2+. Normal sensation light touch per neurovascular intact. No midline cervical spinal tenderness   Skin:     General: Skin is warm and dry. Neurological:      General: No focal deficit present. Mental Status: She is alert and oriented to person, place, and time. Mental status is at baseline.       Comments: Baseline weak on the right side   Psychiatric:         Behavior: Behavior normal.         DIAGNOSTIC RESULTS   LABS:    Labs Reviewed   CBC WITH AUTO DIFFERENTIAL - Abnormal; Notable for the following components:       Result Value    RBC 3.89 (*)     Hemoglobin 10.5 (*)     Hematocrit 32.7 (*)     MPV 11.3 (*)     All other components within normal limits    Narrative:     Performed at:  [facility] [address],  [place], [place]   Phone [phone]   COMPREHENSIVE METABOLIC PANEL - Abnormal; Notable for the following components:    BUN 26 (*)     All other components within normal limits    Narrative:     Performed at:  [facility] [address],  [place], [place]   Phone [phone]   CK    Narrative:     Performed at:  [facility] [address],  [place], [place]   Phone [phone]   URINE RT REFLEX TO CULTURE       All other labs were within normal range or not returned as of this dictation. EKG: All EKG's are interpreted by the Emergency Department Physician in the absence of a cardiologist.  Please see their note for interpretation of EKG. RADIOLOGY:   Non-plain film images such as CT, Ultrasound and MRI are read by the radiologist. Plain radiographic images are visualized and preliminarily interpreted by the ED Provider with the below findings:        Interpretation per the Radiologist below, if available at the time of this note:    CT CERVICAL SPINE WO CONTRAST   Final Result   No acute abnormality of the cervical spine. CT HEAD WO CONTRAST   Final Result   No acute intracranial abnormality. XR CHEST PORTABLE   Final Result   1. No acute abnormality. XR FOOT RIGHT (MIN 3 VIEWS)   Preliminary Result   1. No acute osseous abnormality of the right ankle or right foot. 2. Nonspecific diffuse soft tissue swelling of the right ankle and right foot   with diffuse osteopenia. 3. Mild osteoarthritis throughout the right foot. XR ANKLE RIGHT (MIN 3 VIEWS)   Preliminary Result   1. No acute osseous abnormality of the right ankle or right foot. 2. Nonspecific diffuse soft tissue swelling of the right ankle and right foot   with diffuse osteopenia. 3. Mild osteoarthritis throughout the right foot. Xr Ankle Right (min 3 Views)    Result Date: 2/19/2021  EXAMINATION: THREE XRAY VIEWS OF THE RIGHT FOOT; THREE XRAY VIEWS OF THE RIGHT ANKLE 2/19/2021 1:18 pm COMPARISON: None. HISTORY: ORDERING SYSTEM PROVIDED HISTORY: fall TECHNOLOGIST PROVIDED HISTORY: Reason for exam:->fall Reason for Exam: Fall (Arrived by [facility] rt fall sustained last night at 0100. Pt was found on the floor by staff at [address] [place] [address] at 0930. Denies hitting her head; reporting pain to right ankle. Hx of stroke with right sided deficits) Acuity: Acute Type of Exam: Initial FINDINGS: Three views of the right ankle were performed. There is no acute fracture or dislocation. The ankle mortise is aligned. Joint spaces of the hindfoot are preserved. There is nonspecific diffuse osteopenia. There is diffuse soft tissue swelling. Atherosclerotic calcifications are evident. Three views of the right foot were performed. There is no acute fracture or dislocation. No periosteal reaction or osseous destruction is evident. There is mild multifocal osteoarthritis. There is diffuse nonspecific osteopenia. There is diffuse soft tissue swelling. Atherosclerotic calcifications are evident. 1. No acute osseous abnormality of the right ankle or right foot. 2. Nonspecific diffuse soft tissue swelling of the right ankle and right foot with diffuse osteopenia. 3. Mild osteoarthritis throughout the right foot. Xr Foot Right (min 3 Views)    Result Date: 2/19/2021  EXAMINATION: THREE XRAY VIEWS OF THE RIGHT FOOT; THREE XRAY VIEWS OF THE RIGHT ANKLE 2/19/2021 1:18 pm COMPARISON: None. HISTORY: ORDERING SYSTEM PROVIDED HISTORY: fall TECHNOLOGIST PROVIDED HISTORY: Reason for exam:->fall Reason for Exam: Fall (Arrived by [facility] rt fall sustained last night at 0100.  Pt was found on the floor by staff at [facility] at 0930. Denies hitting her head; reporting pain to right ankle. Hx of stroke with right sided deficits) Acuity: Acute Type of Exam: Initial FINDINGS: Three views of the right ankle were performed. There is no acute fracture or dislocation. The ankle mortise is aligned. Joint spaces of the hindfoot are preserved. There is nonspecific diffuse osteopenia. There is diffuse soft tissue swelling. Atherosclerotic calcifications are evident. Three views of the right foot were performed. There is no acute fracture or dislocation. No periosteal reaction or osseous destruction is evident. There is mild multifocal osteoarthritis. There is diffuse nonspecific osteopenia. There is diffuse soft tissue swelling. Atherosclerotic calcifications are evident. 1. No acute osseous abnormality of the right ankle or right foot. 2. Nonspecific diffuse soft tissue swelling of the right ankle and right foot with diffuse osteopenia. 3. Mild osteoarthritis throughout the right foot. Ct Head Wo Contrast    Result Date: 2/19/2021  EXAMINATION: CT OF THE HEAD WITHOUT CONTRAST  2/19/2021 1:54 pm TECHNIQUE: CT of the head was performed without the administration of intravenous contrast. Dose modulation, iterative reconstruction, and/or weight based adjustment of the mA/kV was utilized to reduce the radiation dose to as low as reasonably achievable. COMPARISON: 09/12/2017 MRI HISTORY: ORDERING SYSTEM PROVIDED HISTORY: fall TECHNOLOGIST PROVIDED HISTORY: Reason for exam:->fall Has a \"code stroke\" or \"stroke alert\" been called? ->No Decision Support Exception->Emergency Medical Condition (MA) Is the patient pregnant?->No Reason for Exam: fall Acuity: Acute Type of Exam: Initial Mechanism of Injury: fall FINDINGS: BRAIN/VENTRICLES: The ventricles and sulci are prominent. Pattern is consistent with age-related atrophy.  No extra-axial fluid collections, and no sign of recent intracranial hemorrhage. Decreased attenuation is noted within the periventricular white matter. Pattern is consistent with chronic small vessel ischemic change. No acute edema or mass effect. No mass lesions are detected. ORBITS: The visualized portion of the orbits demonstrate no acute abnormality. SINUSES: The visualized paranasal sinuses and mastoid air cells demonstrate no acute abnormality. SOFT TISSUES/SKULL:  No acute abnormality of the visualized skull or soft tissues. No acute intracranial abnormality. Ct Cervical Spine Wo Contrast    Result Date: 2/19/2021  EXAMINATION: CT OF THE CERVICAL SPINE WITHOUT CONTRAST 2/19/2021 1:54 pm TECHNIQUE: CT of the cervical spine was performed without the administration of intravenous contrast. Multiplanar reformatted images are provided for review. Dose modulation, iterative reconstruction, and/or weight based adjustment of the mA/kV was utilized to reduce the radiation dose to as low as reasonably achievable. COMPARISON: None. HISTORY: ORDERING SYSTEM PROVIDED HISTORY: fall TECHNOLOGIST PROVIDED HISTORY: Reason for exam:->fall Decision Support Exception->Emergency Medical Condition (MA) Is the patient pregnant?->No Reason for Exam: fall Acuity: Acute Type of Exam: Initial Mechanism of Injury: fall Neck pain. FINDINGS: BONES/ALIGNMENT: There is no acute fracture or traumatic malalignment. DEGENERATIVE CHANGES: No significant degenerative changes. SOFT TISSUES: There is no prevertebral soft tissue swelling. No acute abnormality of the cervical spine. Xr Chest Portable    Result Date: 2/19/2021  EXAMINATION: ONE XRAY VIEW OF THE CHEST 2/19/2021 1:18 pm COMPARISON: 04/30/2017 HISTORY: ORDERING SYSTEM PROVIDED HISTORY: SOB TECHNOLOGIST PROVIDED HISTORY: Reason for exam:->SOB Reason for Exam: Fall (Arrived by [facility] rt fall sustained last night at 0100. Pt was found on the floor by staff at [address] [place] [address] at 0930.  Denies hitting her head; reporting pain to right ankle. Hx of stroke with right sided deficits) Acuity: Acute Type of Exam: Initial FINDINGS: The lungs are clear. A cardiac digital recorder device is in situ. The cardiac silhouette is within normal limits. There is no pneumothorax or pleural effusion. 1.  No acute abnormality. PROCEDURES   Unless otherwise noted below, none     Procedures    CRITICAL CARE TIME   N/A    CONSULTS:  None      EMERGENCY DEPARTMENT COURSE and DIFFERENTIAL DIAGNOSIS/MDM:   Vitals:    Vitals:    02/19/21 1156 02/19/21 1200 02/19/21 1201 02/19/21 1415   BP:    (!) 145/90   Pulse:  78     Resp:   18    Temp: 97.5 °F (36.4 °C)      TempSrc: Oral      SpO2:  100%  99%   Weight:  168 lb (76.2 kg)     Height:  5' 4\" (1.626 m)         Patient was given the following medications:  Medications - No data to display        Briefly, this is a 80-year-old female who presents to the emergency department today for evaluation for a fall which occurred sometime last night. The patient has baseline weak on the right side, she states that she is at [address] [facility] home, she states that she is unable to walk due to her baseline weakness, and she states that she attempted to walk, and she was unable to do so and fell. She did hit her head no loss of consciousness. No vomiting. Apparently the patient was lying on the floor until this morning when staff was able to help her. When patient arrived to the ED she is complaining of pain to her right ankle and her right foot. On examination she does have passive range of motion, diffuse tenderness with some mild edema. Neurovascular intact. X-ray right foot and right ankle are negative. Ace wrap ordered. Patient has no focal neurological deficits other than her baseline weakness on the right side. She is otherwise awake, alert.   CT of head and cervical spine are negative    Labs were obtained, and are negative, CK is negative    Patient tells me that she fell because she attempted to walk and she is actually unable to walk due to his baseline weakness on the right side. Patient denies any symptoms prior to the fall, and therefore I feel that she can be managed as an outpatient. She will be discharged back to nursing home facility as my suspicion is low at this time for acute internal hemorrhage, subarachnoid hemorrhage, epidural hematoma, subdural hematoma, skull fracture, compression fracture or other emergent etiology      FINAL IMPRESSION      1. Fall, initial encounter    2.  Sprain of right ankle, unspecified ligament, initial encounter          DISPOSITION/PLAN   DISPOSITION Decision To Discharge 02/19/2021 03:44:24 PM      PATIENT REFERREDTO:  Mahamed Ramirez, [address] [facility] [address] [address] [address]  [phone]    Schedule an appointment as soon as possible for a visit in 3 days      [facility] Emergency Department  [address] [place] [address]  [phone]    As needed, If symptoms worsen      DISCHARGE MEDICATIONS:  New Prescriptions    No medications on file       DISCONTINUED MEDICATIONS:  Discontinued Medications    No medications on file              (Please note that portions of this note were completed with a voice recognition program.  Efforts were made to edit the dictations but occasionally words are mis-transcribed.)    Clare Jones PA-C (electronically signed)            Clare Jones PA-C  02/19/21 5151

## 2021-02-19 NOTE — CARE COORDINATION
ROBIN received a call from pt's mom, Becca Cordero, who expressed frustration with pt's current living arrangement. Mom stated pt lives in the assisted living wing at [facility]. Mom states pt has come to the ED 3 times in the last month d/t falls. Mom does not feel she receives adequate care there and does not want pt to return to the facility. Stated she wants pt to go to [facility]. SW provided emotional support explaining that transferring Assisted or LTC facilities from the hospital, especially the ED, is not possible. ROBIN informed this process can take weeks, sometimes longer and we could not hold a patient in the ED or admit them for this reason. Mom asked SW to call Ishmael Baez at [facility], [phone], to try and assist in the situation. ROBIN provided pt's mom with the [place] phone number and informed of program, encouraging mom to call and report issues at [facility] that the patient is having. ROBIN contacted Ishmael Baez who reported pt used to be LTC there at [address] [place] [address] and \"wanted to try Assisted Living. \"  Stated they are in the process of bringing her back to [facility] but people at [facility] are not very responsive and taking longer than expected. ROBIN also explained to Ishmael Baez the same thing that we could not hold or admit the pt from the ED while this transfer transpires. Robyn verbalized understanding and just asked for an update if pt gets discharged or admitted.     Electronically signed by JAMIE Dozier, MICHELLE on 2/19/2021 at 12:16 PM

## 2021-02-19 NOTE — ED NOTES
Reviewed discharge instructions, home meds, and follow up care with patient, verbalized understanding.       Lizet Ramos RN  02/19/21 0104

## 2021-02-19 NOTE — CARE COORDINATION
SW received a call from pt's  with [employer] stating that they have approved for pt to go to [facility] as SNF without needing PT/OT and H&P completed with their waiver. Stated they know pt will eventually end up LTC there and can use the facility's H&P and therapy evals when they receive them on Monday. SW received a call from Keagan Dave at [address] who also confirmed this information is accurate and that pt can come to them today if discharged from ED. SW waiting on disposition.     Electronically signed by JAMIE Estrada, ISABELLEW on 2/19/2021 at 1:58 PM

## 2021-02-19 NOTE — ED NOTES
Arrived by [facility] rt fall sustained last night at 0100. Pt was found on the floor by staff at [address] at 0930. Denies hitting her head; reporting pain to right ankle. Hx of stroke with right sided deficits. Monitors in place.      Jose Thorpe RN  02/19/21 7919